# Patient Record
Sex: MALE | Race: WHITE | NOT HISPANIC OR LATINO | URBAN - METROPOLITAN AREA
[De-identification: names, ages, dates, MRNs, and addresses within clinical notes are randomized per-mention and may not be internally consistent; named-entity substitution may affect disease eponyms.]

---

## 2019-10-15 ENCOUNTER — EMERGENCY (EMERGENCY)
Facility: HOSPITAL | Age: 29
LOS: 1 days | Discharge: HOME | End: 2019-10-15
Attending: EMERGENCY MEDICINE | Admitting: EMERGENCY MEDICINE
Payer: COMMERCIAL

## 2019-10-15 VITALS
DIASTOLIC BLOOD PRESSURE: 58 MMHG | HEART RATE: 77 BPM | RESPIRATION RATE: 18 BRPM | SYSTOLIC BLOOD PRESSURE: 126 MMHG | OXYGEN SATURATION: 97 % | TEMPERATURE: 98 F

## 2019-10-15 VITALS
SYSTOLIC BLOOD PRESSURE: 131 MMHG | RESPIRATION RATE: 16 BRPM | TEMPERATURE: 101 F | OXYGEN SATURATION: 97 % | HEART RATE: 90 BPM | DIASTOLIC BLOOD PRESSURE: 83 MMHG | WEIGHT: 184.97 LBS

## 2019-10-15 LAB
ALBUMIN SERPL ELPH-MCNC: 3.9 G/DL — SIGNIFICANT CHANGE UP (ref 3.4–5)
ALP SERPL-CCNC: 111 U/L — SIGNIFICANT CHANGE UP (ref 40–120)
ALT FLD-CCNC: 69 U/L — HIGH (ref 12–42)
ANION GAP SERPL CALC-SCNC: 10 MMOL/L — SIGNIFICANT CHANGE UP (ref 9–16)
APPEARANCE UR: CLEAR — SIGNIFICANT CHANGE UP
AST SERPL-CCNC: 56 U/L — HIGH (ref 15–37)
BILIRUB SERPL-MCNC: 0.4 MG/DL — SIGNIFICANT CHANGE UP (ref 0.2–1.2)
BILIRUB UR-MCNC: NEGATIVE — SIGNIFICANT CHANGE UP
BUN SERPL-MCNC: 9 MG/DL — SIGNIFICANT CHANGE UP (ref 7–23)
CALCIUM SERPL-MCNC: 8.7 MG/DL — SIGNIFICANT CHANGE UP (ref 8.5–10.5)
CHLORIDE SERPL-SCNC: 105 MMOL/L — SIGNIFICANT CHANGE UP (ref 96–108)
CO2 SERPL-SCNC: 26 MMOL/L — SIGNIFICANT CHANGE UP (ref 22–31)
COLOR SPEC: YELLOW — SIGNIFICANT CHANGE UP
CREAT SERPL-MCNC: 1.01 MG/DL — SIGNIFICANT CHANGE UP (ref 0.5–1.3)
DIFF PNL FLD: NEGATIVE — SIGNIFICANT CHANGE UP
GLUCOSE SERPL-MCNC: 113 MG/DL — HIGH (ref 70–99)
GLUCOSE UR QL: NEGATIVE — SIGNIFICANT CHANGE UP
HCT VFR BLD CALC: 44.1 % — SIGNIFICANT CHANGE UP (ref 39–50)
HGB BLD-MCNC: 15.3 G/DL — SIGNIFICANT CHANGE UP (ref 13–17)
KETONES UR-MCNC: NEGATIVE — SIGNIFICANT CHANGE UP
LEUKOCYTE ESTERASE UR-ACNC: NEGATIVE — SIGNIFICANT CHANGE UP
LIDOCAIN IGE QN: 170 U/L — SIGNIFICANT CHANGE UP (ref 73–393)
MCHC RBC-ENTMCNC: 29.3 PG — SIGNIFICANT CHANGE UP (ref 27–34)
MCHC RBC-ENTMCNC: 34.7 G/DL — SIGNIFICANT CHANGE UP (ref 32–36)
MCV RBC AUTO: 84.3 FL — SIGNIFICANT CHANGE UP (ref 80–100)
NITRITE UR-MCNC: NEGATIVE — SIGNIFICANT CHANGE UP
PH UR: 7 — SIGNIFICANT CHANGE UP (ref 5–8)
PLATELET # BLD AUTO: 130 K/UL — LOW (ref 150–400)
POTASSIUM SERPL-MCNC: 4.1 MMOL/L — SIGNIFICANT CHANGE UP (ref 3.5–5.3)
POTASSIUM SERPL-SCNC: 4.1 MMOL/L — SIGNIFICANT CHANGE UP (ref 3.5–5.3)
PROT SERPL-MCNC: 7.3 G/DL — SIGNIFICANT CHANGE UP (ref 6.4–8.2)
PROT UR-MCNC: NEGATIVE MG/DL — SIGNIFICANT CHANGE UP
RBC # BLD: 5.23 M/UL — SIGNIFICANT CHANGE UP (ref 4.2–5.8)
RBC # FLD: 12.9 % — SIGNIFICANT CHANGE UP (ref 10.3–14.5)
SODIUM SERPL-SCNC: 141 MMOL/L — SIGNIFICANT CHANGE UP (ref 132–145)
SP GR SPEC: <=1.005 — SIGNIFICANT CHANGE UP (ref 1–1.03)
UROBILINOGEN FLD QL: 0.2 E.U./DL — SIGNIFICANT CHANGE UP
WBC # BLD: 4.3 K/UL — SIGNIFICANT CHANGE UP (ref 3.8–10.5)
WBC # FLD AUTO: 4.3 K/UL — SIGNIFICANT CHANGE UP (ref 3.8–10.5)

## 2019-10-15 PROCEDURE — 99284 EMERGENCY DEPT VISIT MOD MDM: CPT

## 2019-10-15 PROCEDURE — 74177 CT ABD & PELVIS W/CONTRAST: CPT | Mod: 26

## 2019-10-15 RX ORDER — MULTIVIT WITH MIN/MFOLATE/K2 340-15/3 G
1 POWDER (GRAM) ORAL ONCE
Refills: 0 | Status: COMPLETED | OUTPATIENT
Start: 2019-10-15 | End: 2019-10-15

## 2019-10-15 RX ORDER — IOHEXOL 300 MG/ML
30 INJECTION, SOLUTION INTRAVENOUS ONCE
Refills: 0 | Status: COMPLETED | OUTPATIENT
Start: 2019-10-15 | End: 2019-10-15

## 2019-10-15 RX ORDER — SODIUM CHLORIDE 9 MG/ML
1000 INJECTION INTRAMUSCULAR; INTRAVENOUS; SUBCUTANEOUS ONCE
Refills: 0 | Status: COMPLETED | OUTPATIENT
Start: 2019-10-15 | End: 2019-10-15

## 2019-10-15 RX ORDER — ACETAMINOPHEN 500 MG
975 TABLET ORAL ONCE
Refills: 0 | Status: COMPLETED | OUTPATIENT
Start: 2019-10-15 | End: 2019-10-15

## 2019-10-15 RX ADMIN — SODIUM CHLORIDE 1000 MILLILITER(S): 9 INJECTION INTRAMUSCULAR; INTRAVENOUS; SUBCUTANEOUS at 01:45

## 2019-10-15 RX ADMIN — Medication 975 MILLIGRAM(S): at 01:44

## 2019-10-15 RX ADMIN — Medication 1 BOTTLE: at 04:50

## 2019-10-15 RX ADMIN — IOHEXOL 30 MILLILITER(S): 300 INJECTION, SOLUTION INTRAVENOUS at 01:47

## 2019-10-15 NOTE — ED PROVIDER NOTE - PROGRESS NOTE DETAILS
discussed ct results, given mg citrate in ed, and advised start stool softener, ct c/w constipation, patient has no focal rlq, and well appearing. given strict return precautions for any worsening.

## 2019-10-15 NOTE — ED PROVIDER NOTE - OBJECTIVE STATEMENT
28 yom pw abd pain.  started Sunday, vague, around umbilicus, w/ dec appetite but no vomiting.  sx worse today.  fever today.  also had a HA yesterday/today, no neck pain, no stiffness.  pt states was at a wedding Saturday, had about 10 drinks (mixture of content throughout the day), more than usual, and had greasy food.  last bm today, feels somewhat constipated.

## 2019-10-15 NOTE — ED ADULT TRIAGE NOTE - CHIEF COMPLAINT QUOTE
pt sent from urgent care for evaluation of abdominal pain for a day with nausea. Pt states pain is in the lower abdomen but hurts more to right lower quadrant. Febrile on arrival.

## 2019-10-15 NOTE — ED PROVIDER NOTE - PATIENT PORTAL LINK FT
You can access the FollowMyHealth Patient Portal offered by [facility] by registering at the following website: [email or web address]. By joining [employer]’s FollowMyHealth portal, you will also be able to view your health information using other applications (apps) compatible with our system.

## 2019-10-15 NOTE — ED ADULT NURSE NOTE - OBJECTIVE STATEMENT
Pt is a 28y male complaining of dull generalized abdominal pain x1 day associated with headache, fever, and chills. PT states that he had a small bowel movement earlier today. PT denies nausea, vomiting, chest pain, and sob.

## 2019-10-15 NOTE — ED PROVIDER NOTE - CLINICAL SUMMARY MEDICAL DECISION MAKING FREE TEXT BOX
fever, anorexia, initial generalized periumbilical abd pain now w/ rlq tenderness, neg rovsing, ?appendicitis ?pancreatitis, will check labs, CT

## 2019-10-15 NOTE — ED PROVIDER NOTE - PHYSICAL EXAMINATION
Physical Exam  GEN: Awake, alert, non-toxic appearing, NCAT  EYES: full EOMI,  ENT: External inspection normal, normal voice,  HEAD: atraumatic  NECK: FROM neck, supple, no meningismus, trachea midline, no JVD  RESP: no tachypnea, no hypoxia, no resp distress,  GI: +BS, Soft, tender diffusely but most prominently rlq, no guarding/rebound,   MSK: FROM all 4 extremities, N/V intact,   SKIN: Color normal for race, warm and dry, no rash  NEURO: Oriented x3, CN 2-12 grossly intact, normal motor, normal sensory

## 2019-10-20 DIAGNOSIS — R51 HEADACHE: ICD-10-CM

## 2019-10-20 DIAGNOSIS — R10.31 RIGHT LOWER QUADRANT PAIN: ICD-10-CM

## 2019-10-20 DIAGNOSIS — R63.0 ANOREXIA: ICD-10-CM

## 2021-11-04 NOTE — ED ADULT NURSE NOTE - CAS DISCH TRANSFER METHOD
SUBJECTIVE:  I had the pleasure of seeing Sreekanth Alvarado for cardiac follow up.      HPI:     Sreekanth Alvarado is an 80 year old male with h/o HTN,CAD - s/p PCI in 1998, Bradycardia - s/p permanent pacemaker, Lymphoma, Hyperlipidemia, carotid stenosis presents here today to follow-up for blood pressure.  He was last seen by Dr. Mann on 9/23/2021.During that visit his dose of Bystolic was increased to 10 mg daily. He reduced his bystolic to 5mg couple weeks ago, because he has had a rash on the righ leg. He mentions that the rash was there for many years, feels that when he reduced th dose, his rash is little better. He has some burning sensation and and itching. He denies of noticing rash any other places. He only has the riash on there right and left lower leg.  He denies of feeling dizzy or lightheaded.  His home blood pressure readings are still elevated in 140s to 150s systolic.  He denies of having chest pain, shortness of breath or palpitations.  Denies of waking up from his sleep due to chest pain, shortness of breath or palpitations.  He is planning to go to [place] for 1 month on December 3.      Past Medical History:   Diagnosis Date   • B-cell lymphoma (CMS/HCC) 04/19/2019   • CAD (coronary artery disease)     s/p PTCA to LCX and PDA 4/3/1998    • Carotid stenosis    • Chronic open angle glaucoma    • Diastolic congestive heart failure (CMS/[facility])    • GERD (gastroesophageal reflux disease)    • History of chemotherapy     RCHOP x 6 cycles (6/3/19-9/3/19)   • History of radiation therapy     retroperitoneum ISRT: 36 gY in 20 fx, 10/22/19-11/18/19   • HLD (hyperlipidemia)    • HTN (hypertension)    • Lumbago    • Meibomian gland dysfunction    • Spinal stenosis    • SSS (sick sinus syndrome) (CMS/[facility])     s/p dual chamber pacemaker ([facility]) 3/8/17      Past Surgical History:   Procedure Laterality Date   • ------------other-------------      urolift per patient   • Appendectomy     • Bone  marrow biopsy  2019   • Cataract extraction, bilateral     • Hemorrhoidectomy     • Left heart cath     • Lymph node biopsy  2019    CT-guided retroperitoneal lymph node   • Mediport insertion, single  2019   • Open coronary endarterectomy     • Pacemaker  2017    dual chamber   • Spine surgery     • Tonsillectomy       Family History   Problem Relation Age of Onset   • Heart disease Father    • Kidney disease Father    • Cancer Brother    • Heart disease Brother    • Kidney disease Brother    • Cancer Maternal Grandfather          from Black Lung Disease   • COPD Maternal Grandfather    • Heart disease Paternal Grandfather      Social History     Socioeconomic History   • Marital status: /Civil Union     Spouse name: Not on file   • Number of children: 3   • Years of education: Not on file   • Highest education level: Not on file   Occupational History   • Occupation: retiired from [employer]   Tobacco Use   • Smoking status: Never Smoker   • Smokeless tobacco: Never Used   Substance and Sexual Activity   • Alcohol use: Not Currently   • Drug use: Never   • Sexual activity: Not Currently   Other Topics Concern   • Not on file   Social History Narrative    3 biological children and 3 step-children     Social Determinants of Health     Financial Resource Strain:    • Social Determinants: Financial Resource Strain: Not on file   Food Insecurity:    • Social Determinants: Food Insecurity: Not on file   Transportation Needs:    • Lack of Transportation (Medical): Not on file   • Lack of Transportation (Non-Medical): Not on file   Physical Activity:    • Days of Exercise per Week: Not on file   • Minutes of Exercise per Session: Not on file   Stress:    • Social Determinants: Stress: Not on file   Social Connections:    • Social Determinants: Social Connections: Not on file   Intimate Partner Violence:    • Social Determinants: Intimate Partner Violence Past Fear: Not on file   •  Social Determinants: Intimate Partner Violence Current Fear: Not on file       MEDICATIONS:   Current Outpatient Medications   Medication Sig Dispense Refill   • nebivolol (Bystolic) 10 MG tablet Take 1 tablet by mouth daily. 90 tablet 3   • amLODIPine (NORVASC) 5 MG tablet Take 1 tablet by mouth daily. 30 tablet 5   • simvastatin (ZOCOR) 20 MG tablet Take 1 tablet by mouth nightly. 90 tablet 3   • neomycin-polymyxin B-dexamethasone (MAXITROL) 3.5-05498-4.1 ophthalmic suspension Place 1 drop into right eye 4 times daily. (Patient taking differently: Place 1 drop into right eye 4 times daily. Indications: patient reports takes as needed ) 5 mL 0   • aspirin 81 MG tablet Take 81 mg by mouth daily.     • docusate sodium-sennosides (SENOKOT S) 50-8.6 MG per tablet Take 1 tablet by mouth nightly.     • Hypromellose (ARTIFICIAL TEARS OP) Indications: Patient reports as prn      • GLUCOSAMINE CHONDROITIN COMPLX PO Take by mouth daily.      • Ascorbic Acid (VITAMIN C) 500 MG Cap Take 500 mg by mouth daily.      • Lactobacillus (ACIDOPHILUS) Tab Take 1 tablet by mouth as needed. Indications: reports takes every so often      • Multiple Vitamins-Minerals (CENTRUM SILVER) tablet 1/2 tab qd     • Vitamins-Lipotropics (LIPOFLAVONOID) Tab 1/2 tab qd.       No current facility-administered medications for this visit.     Facility-Administered Medications Ordered in Other Visits   Medication Dose Route Frequency Provider Last Rate Last Admin   • fludeoxyglucose F-18 (FDG)  MCI/ML injection 12.61 millicurie  12.61 millicurie Intravenous Once Demetri Bell MD             PHYSICAL EXAM:  BP (!) 160/78 (BP Location: RUE - Right upper extremity, Patient Position: Sitting, Cuff Size: Regular)   Pulse 60   Resp 18   Ht 5' 10\" (1.778 m)   Wt 87.6 kg (193 lb 3.2 oz)   BMI 27.72 kg/m²   BSA 2.06 m²      This is an alert and oriented to time, place, and person non-obese, well groomed and well-hydrated 80 year old male. Patient  is in no acute distress.    HEENT: 3/3 carotid pulses bilateral symmetrical without bruit. No JVD seen at 90 degree. Conjunctiva white and non-jaundice. SOWMYA. Oral mucosa is pink and moist.     Resp: Respiratory effort bilateral symmetrical with good expansion. Lungs are clear to auscultation. No wheezes and rhonchi auscultated.    CV:    PMI at 5th intercostal space mid-clavicular line.  Heart rate and rhythm are regular at  60 BPM. No murmur, S3, nor S4 auscultated. Normal S1 and S2.    ABD:  Soft, normal active bowel sound in all 4 quadrants, non-tender to palpation. No hepatomegaly or splenomegaly. No bruit over abdominal aorta.    EXT: +dry scaly rash on the lower leg bilat, no erythema. 3/3 Femoral Artery pulses bilateral without bruit. no edema . No cyanosis, or clubbing noted. 3/3 pulses palpated at Dorsalis Pedis and posterior tibial artery bilaterally. No skin pigmentation or ulceration noted bilaterally.    Neuro: CN II-XII grossly intact. Gait is steady. Speech is fluent and coherent. Muscular strength 5/5 bilaterally in all 4 extremities.    Lab / Testing:    Nurse Only on 09/09/2021   Component Date Value Ref Range Status   • WHITE BLOOD CELL COUNT 09/09/2021 5.3  4.0 - 10.0 10*3/uL Final   • RED BLOOD CELL COUNT 09/09/2021 4.76  3.90 - 5.70 10*6/uL Final   • HEMOGLOBIN 09/09/2021 15.1  13.7 - 17.5 g/dL Final   • HEMATOCRIT 09/09/2021 44.3  40.0 - 51.0 % Final   • MEAN CORPUSCULAR VOLUME 09/09/2021 93.1  79.0 - 95.0 fL Final   • MEAN CORPUSCULAR HGB 09/09/2021 31.7  27.0 - 34.0 pg Final   • MEAN CORPUSCULAR HGB CONCENTRATION 09/09/2021 34.1  32.0 - 36.0 % Final   • PLATELET COUNT 09/09/2021 93* 150 - 400 10*3/uL Final   • MEAN PLATELET VOLUME 09/09/2021 10.0  8.6 - 12.4 fL Final   • RED CELL DISTRIBUTION WIDTH 09/09/2021 12.7  11.3 - 14.8 % Final   • NEUTROPHIL PERCENT 09/09/2021 63.6  34.0 - 73.5 % Final   • NEUTROPHIL ABSOLUTE # 09/09/2021 3.4  1.4 - 6.5 10*3/uL Final   • IMMATURE GRANULOCYTE  PERCENT 09/09/2021 0.2  0.0 - 0.5 % Final   • IMMATURE GRANULOCYTE ABSOLUTE 09/09/2021 0.01  0.00 - 0.05 10*3/uL Final   • LYMPH PERCENT 09/09/2021 21.4  20.5 - 51.1 % Final   • LYMPHOCYTE ABSOLUTE # 09/09/2021 1.1* 1.2 - 3.4 10*3/uL Final   • MONOCYTE PERCENT 09/09/2021 11.0  4.3 - 12.9 % Final   • MONOCYTE ABSOLUTE # 09/09/2021 0.6  0.2 - 0.9 10*3/uL Final   • EOSINOPHIL % 09/09/2021 3.2  0.0 - 10.0 % Final   • EOSINOPHIL ABSOLUTE # 09/09/2021 0.2  0.0 - 0.5 10*3/uL Final   • BASOPHIL % 09/09/2021 0.6  0.0 - 1.2 % Final   • BASOPHIL ABSOLUTE # 09/09/2021 0.0  0.0 - 0.1 10*3/uL Final   • DIFFERENTIAL TYPE 09/09/2021 AUTO DIFF WITH SMEAR REVIEW   Final   • PROTEIN, TOTAL SERUM 09/09/2021 6.8  6.2 - 8.1 g/dL Final   • NA (SODIUM, SERUM) 09/09/2021 140  136 - 146 mmol/L Final   • K (POTASSIUM, SERUM) 09/09/2021 4.1  3.5 - 5.3 mmol/L Final   • GLUCOSE, RANDOM 09/09/2021 108  70 - 200 mg/dL Final   • CREATININE, SERUM 09/09/2021 1.0  0.6 - 1.6 mg/dL Final   • CO2 VENOUS 09/09/2021 21* 22 - 32 mmol/L Final   • CHLORIDE, SERUM 09/09/2021 107  96 - 107 mmol/L Final   • BLOOD UREA NITROGEN 09/09/2021 18  6 - 27 mg/dL Final   • BILIRUBIN, TOTAL 09/09/2021 0.9  0.0 - 1.0 mg/dL Final   • CALCIUM, SERUM 09/09/2021 9.5  8.6 - 10.6 mg/dL Final   • ASPARTATE AMINOTRNSFRASE(SGOT) 09/09/2021 25  9 - 37 U/L Final   • ALANINE AMINOTRANSFERASE(SGPT) 09/09/2021 18  10 - 35 U/L Final   • ALKALINE PHOSPHATASE([id number]) 09/09/2021 55  45 - 115 U/L Final   • ALBUMIN, SERUM 09/09/2021 4.2  3.6 - 5.1 g/dL Final   • EGFR -R 09/09/2021 >60  >60 mL/min/[1.73m2] Final   • EGFR NON--R 09/09/2021 >60  >60 mL/min/[1.73m2] Final   • LDH (LACTATE DEHYDROGENASE) 09/09/2021 474  313 - 618 U/L Final   • RBC & PLT MORPHOLOGY 09/09/2021 decreased PLT observed   Final   Imaging Services on 09/07/2021   Component Date Value Ref Range Status   • GFR, POC 09/07/2021 >60   Final   Office Visit on 07/29/2021   Component Date Value  Ref Range Status   • BLDR Scan mLs 07/29/2021 0ml   Final   Nurse Only on 06/04/2021   Component Date Value Ref Range Status   • LDH (LACTATE DEHYDROGENASE) 06/04/2021 651* 313 - 618 U/L Final   • WHITE BLOOD CELL COUNT 06/04/2021 6.7  4.0 - 10.0 10*3/uL Final   • RED BLOOD CELL COUNT 06/04/2021 4.77  3.90 - 5.70 10*6/uL Final   • HEMOGLOBIN 06/04/2021 15.0  13.7 - 17.5 g/dL Final   • HEMATOCRIT 06/04/2021 44.8  40.0 - 51.0 % Final   • MEAN CORPUSCULAR VOLUME 06/04/2021 93.9  79.0 - 95.0 fL Final   • MEAN CORPUSCULAR HGB 06/04/2021 31.4  27.0 - 34.0 pg Final   • MEAN CORPUSCULAR HGB CONCENTRATION 06/04/2021 33.5  32.0 - 36.0 % Final   • PLATELET COUNT 06/04/2021 109* 150 - 400 10*3/uL Final   • MEAN PLATELET VOLUME 06/04/2021 9.7  8.6 - 12.4 fL Final   • RED CELL DISTRIBUTION WIDTH 06/04/2021 12.7  11.3 - 14.8 % Final   • NEUTROPHIL PERCENT 06/04/2021 66.5  34.0 - 73.5 % Final   • NEUTROPHIL ABSOLUTE # 06/04/2021 4.5  1.4 - 6.5 10*3/uL Final   • IMMATURE GRANULOCYTE PERCENT 06/04/2021 0.1  0.0 - 0.5 % Final   • IMMATURE GRANULOCYTE ABSOLUTE 06/04/2021 0.01  0.00 - 0.05 10*3/uL Final   • LYMPH PERCENT 06/04/2021 19.5* 20.5 - 51.1 % Final   • LYMPHOCYTE ABSOLUTE # 06/04/2021 1.3  1.2 - 3.4 10*3/uL Final   • MONOCYTE PERCENT 06/04/2021 11.0  4.3 - 12.9 % Final   • MONOCYTE ABSOLUTE # 06/04/2021 0.7  0.2 - 0.9 10*3/uL Final   • EOSINOPHIL % 06/04/2021 2.2  0.0 - 10.0 % Final   • EOSINOPHIL ABSOLUTE # 06/04/2021 0.2  0.0 - 0.5 10*3/uL Final   • BASOPHIL % 06/04/2021 0.7  0.0 - 1.2 % Final   • BASOPHIL ABSOLUTE # 06/04/2021 0.1  0.0 - 0.1 10*3/uL Final   • DIFFERENTIAL TYPE 06/04/2021 AUTO DIFF   Final   • PROTEIN, TOTAL SERUM 06/04/2021 7.1  6.2 - 8.1 g/dL Final   • NA (SODIUM, SERUM) 06/04/2021 139  136 - 146 mmol/L Final   • K (POTASSIUM, SERUM) 06/04/2021 4.1  3.5 - 5.3 mmol/L Final   • GLUCOSE, RANDOM 06/04/2021 110  70 - 200 mg/dL Final   • CREATININE, SERUM 06/04/2021 1.2  0.6 - 1.6 mg/dL Final   • CO2 VENOUS  06/04/2021 28  22 - 32 mmol/L Final   • CHLORIDE, SERUM 06/04/2021 104  96 - 107 mmol/L Final   • BLOOD UREA NITROGEN 06/04/2021 16  6 - 27 mg/dL Final   • BILIRUBIN, TOTAL 06/04/2021 0.8  0.0 - 1.0 mg/dL Final   • CALCIUM, SERUM 06/04/2021 9.4  8.6 - 10.6 mg/dL Final   • ASPARTATE AMINOTRNSFRASE(SGOT) 06/04/2021 23  9 - 37 U/L Final   • ALANINE AMINOTRANSFERASE(SGPT) 06/04/2021 16  10 - 35 U/L Final   • ALKALINE PHOSPHATASE([id number]) 06/04/2021 54  45 - 115 U/L Final   • ALBUMIN, SERUM 06/04/2021 4.5  3.6 - 5.1 g/dL Final   • EGFR -R 06/04/2021 >60  >60 mL/min/[1.73m2] Final   • EGFR NON--R 06/04/2021 >60  >60 mL/min/[1.73m2] Final   Nurse Only on 02/03/2021   Component Date Value Ref Range Status   • LDH (LACTATE DEHYDROGENASE) 02/03/2021 476  313 - 618 U/L Final   • PROTEIN, TOTAL SERUM 02/03/2021 6.5  6.2 - 8.1 g/dL Final   • NA (SODIUM, SERUM) 02/03/2021 141  136 - 146 mmol/L Final   • K (POTASSIUM, SERUM) 02/03/2021 4.2  3.5 - 5.3 mmol/L Final   • GLUCOSE, RANDOM 02/03/2021 109  70 - 200 mg/dL Final   • CREATININE, SERUM 02/03/2021 1.1  0.6 - 1.6 mg/dL Final   • CO2 VENOUS 02/03/2021 25  22 - 32 mmol/L Final   • CHLORIDE, SERUM 02/03/2021 106  96 - 107 mmol/L Final   • BLOOD UREA NITROGEN 02/03/2021 17  6 - 27 mg/dL Final   • BILIRUBIN, TOTAL 02/03/2021 0.7  0.0 - 1.0 mg/dL Final   • CALCIUM, SERUM 02/03/2021 9.3  8.6 - 10.6 mg/dL Final   • ASPARTATE AMINOTRNSFRASE(SGOT) 02/03/2021 26  9 - 37 U/L Final   • ALANINE AMINOTRANSFERASE(SGPT) 02/03/2021 19  10 - 35 U/L Final   • ALKALINE PHOSPHATASE([id number]) 02/03/2021 48  45 - 115 U/L Final   • ALBUMIN, SERUM 02/03/2021 4.3  3.6 - 5.1 g/dL Final   • EGFR -R 02/03/2021 >60  >60 mL/min/[1.73m2] Final   • EGFR NON--R 02/03/2021 >60  >60 mL/min/[1.73m2] Final   • WHITE BLOOD CELL COUNT 02/03/2021 6.9  4.0 - 10.0 10*3/uL Final   • RED BLOOD CELL COUNT 02/03/2021 4.56  3.90 - 5.70 10*6/uL Final   • HEMOGLOBIN  02/03/2021 14.4  13.7 - 17.5 g/dL Final   • HEMATOCRIT 02/03/2021 42.7  40.0 - 51.0 % Final   • MEAN CORPUSCULAR VOLUME 02/03/2021 93.6  79.0 - 95.0 fL Final   • MEAN CORPUSCULAR HGB 02/03/2021 31.6  27.0 - 34.0 pg Final   • MEAN CORPUSCULAR HGB CONCENTRATION 02/03/2021 33.7  32.0 - 36.0 % Final   • PLATELET COUNT 02/03/2021 112* 150 - 400 10*3/uL Final   • MEAN PLATELET VOLUME 02/03/2021 9.8  8.6 - 12.4 fL Final   • RED CELL DISTRIBUTION WIDTH 02/03/2021 12.9  11.3 - 14.8 % Final   • NEUTROPHIL PERCENT 02/03/2021 69.5  34.0 - 73.5 % Final   • NEUTROPHIL ABSOLUTE # 02/03/2021 4.8  1.4 - 6.5 10*3/uL Final   • IMMATURE GRANULOCYTE PERCENT 02/03/2021 0.1  0.0 - 0.5 % Final   • IMMATURE GRANULOCYTE ABSOLUTE 02/03/2021 0.01  0.00 - 0.05 10*3/uL Final   • LYMPH PERCENT 02/03/2021 16.2* 20.5 - 51.1 % Final   • LYMPHOCYTE ABSOLUTE # 02/03/2021 1.1* 1.2 - 3.4 10*3/uL Final   • MONOCYTE PERCENT 02/03/2021 10.6  4.3 - 12.9 % Final   • MONOCYTE ABSOLUTE # 02/03/2021 0.7  0.2 - 0.9 10*3/uL Final   • EOSINOPHIL % 02/03/2021 2.9  0.0 - 10.0 % Final   • EOSINOPHIL ABSOLUTE # 02/03/2021 0.2  0.0 - 0.5 10*3/uL Final   • BASOPHIL % 02/03/2021 0.7  0.0 - 1.2 % Final   • BASOPHIL ABSOLUTE # 02/03/2021 0.1  0.0 - 0.1 10*3/uL Final   • DIFFERENTIAL TYPE 02/03/2021 AUTO DIFF   Final   Imaging Services on 01/21/2021   Component Date Value Ref Range Status   • GFR, POC 01/21/2021 >60   Final   Lab Services on 12/01/2020   Component Date Value Ref Range Status   • CHOLESTEROL, TOTAL 12/01/2020 169  140 - 200 mg/dL Final   • HDL CHOLESTEROL 12/01/2020 44  >40 mg/dL Final   • LDL CHOL, CALCULATED 12/01/2020 96  30 - 100 mg/dL Final   • TRIGLYCERIDES 12/01/2020 146  0 - 200 mg/dL Final        ASSESSMENT/PLAN:   1. Hypertension-blood pressure elevated.  Unable to titrate up bysystolic to 10 mg, possible rash?  Continue Bystolic 5 mg, amlodipine 5 mg daily.  Unable to uptitrate amlodipine due to lower extremity edema.  Start spironolactone 25 mg  daily.  Obtain random BMP in 1 week.  Recommend low-sodium diet less than 2000 mg a day.    2.  Lower extremity rash, appears eczematous  Recommend follow-up with dermatology for further evaluation.  Discussed at length regarding the rash, existed prior to increasing Bystolic.  Recommend evaluation with dermatologist to see if it is related to the drug.    2. Varicose veins    3. CAD - s/p PCI in 1998  No anginal symptoms  Most recent cardiac cath 03/2017 shows moderate CAD. He remains completely asymptomatic.   EKG (11/2020) is within normal limits  Results of Echocardiogram (5/2020) reviewed and LV function is normal  Will continue with aggressive risk factor modification and present medical rx   Recommend he continue ASA 81 mg daily for primary prevention  Will continue to achieve goal LDL with statin therapy. Continue Simvastatin 20 mg daily in PM    4. Bradycardia - s/p permanent pacemaker     5. Lymphoma    6. Hyperlipidemia  Continue simvastatin 20 mg daily.  Low fat low-cholesterol diet recommended.    7. LE edema - resolved off of amlodipine     8. Carotid stenosis - mild bilateral    continue aspirin and statin.     I will see him back in the office in 3 to 4 weeks for further follow up.  Follow-up with Dr. Mann in March 2022 as scheduled or sooner if needed.  Thank you for letting me be involved in the care of this pleasant patient.     Electronically signed by: Ness Gibson PA-C  11/4/2021     Walking

## 2021-11-21 NOTE — ED ADULT NURSE NOTE - SUICIDE SCREENING QUESTION 2
Hospitalist - History & Physical      Patient: Alix Ferguson    Unit/Bed:39/039A   YOB: 1982  MRN: [id number]   Acct: [de-identified]   PCP: Lizzette Rivera MD      Assessment and Plan:        1. Acute hypoxic respiratory failure due to COVID 19:   a. O2 titration protocol - on 100% FiO2 18/8 bipap on admission  b. Supplements - vitamins C, D, and zinc  c. Daily ASA  d. BID Lovenox  e. Dexamethasone 20 mg IVP daily x 5 days, 10mg IVP x 5 days  f. Pharmacy consult for tocilizumab  g. Incentive spirometry and acapella  h. Pulmonary hygiene - albuterol med nebs or inhalers  i. Procalcitonin is 0.50 at this time - secondary bacterial infection is likely - IV abx started   j. Consult ICU NP Adriano Nguyen for possible escalation to ECMO       CC:  Shortness of breath    HPI: Patient presents to the ED with progressive shortness of breath. The patient has been ill for 10 days. The patient was evaluated in an ER two times prior to his presentation here. He was discharged home despite hypoxia. The patient's wife found a holistic doctor that did a series of treatments at his office that gave the patient transient relief. Today prior to presentation the patient has oxygen saturations at 79% on room air. While in the ED the patient escalated from nasal cannula, to high flow nasal cannula to BIPAP to maintain his oxygen saturations. The patient will be admitted to step down for further care and treatment of COVID 19. ROS: Review of Systems   Constitutional: Positive for activity change, appetite change, chills and fever. HENT: Negative. Eyes: Negative. Respiratory: Positive for cough and shortness of breath. Cardiovascular: Negative. Gastrointestinal: Positive for abdominal pain, nausea and vomiting. Endocrine: Negative. Genitourinary: Negative. Musculoskeletal: Positive for arthralgias and myalgias. Skin: Negative. Allergic/Immunologic: Negative. Neurological: Negative. Hematological: Negative. Psychiatric/Behavioral: Negative. PMH:    Past Medical History:   Diagnosis Date    Heart murmur     Hypotension     POTS    POTS (postural orthostatic tachycardia syndrome)     Thyroid disease 2018     SHX:    Social History     Socioeconomic History    Marital status:      Spouse name: Not on file    Number of children: Not on file    Years of education: Not on file    Highest education level: Not on file   Occupational History    Not on file   Tobacco Use    Smoking status: Former Smoker     Quit date: 2016     Years since quittin.2    Smokeless tobacco: Never Used   Vaping Use    Vaping Use: Never used   Substance and Sexual Activity    Alcohol use: No    Drug use: No    Sexual activity: Not on file   Other Topics Concern    Not on file   Social History Narrative    Not on file     Social Determinants of Health     Financial Resource Strain:     Difficulty of Paying Living Expenses: Not on file   Food Insecurity:     Worried About [address] in the Last Year: Not on file    [address] in the Last Year: Not on file   Transportation Needs:     Lack of Transportation (Medical): Not on file    Lack of Transportation (Non-Medical):  Not on file   Physical Activity:     Days of Exercise per Week: Not on file    Minutes of Exercise per Session: Not on file   Stress:     Feeling of Stress : Not on file   Social Connections:     Frequency of Communication with Friends and Family: Not on file    Frequency of Social Gatherings with Friends and Family: Not on file    Attends Restorationist Services: Not on file    Active Member of Clubs or Organizations: Not on file    Attends Club or Organization Meetings: Not on file    Marital Status: Not on file   Intimate Partner Violence:     Fear of Current or Ex-Partner: Not on file    Emotionally Abused: Not on file    Physically Abused: Not on file    Sexually Abused: Not on file Housing Stability:     Unable to Pay for Housing in the Last Year: Not on file    Number of Places Lived in the Last Year: Not on file    Unstable Housing in the Last Year: Not on file     FHX:   Family History   Problem Relation Age of Onset    Cancer Father     Cancer Maternal Grandfather      Allergies:    Allergies   Allergen Reactions    Codeine Other (See Comments)     hallucinations     Medications:       dexamethasone  20 mg IntraVENous Once          Labs:   Recent Results (from the past 24 hour(s))   CBC auto differential    Collection Time: 11/20/21  9:11 PM   Result Value Ref Range    WBC 8.3 4.8 - 10.8 thou/mm3    RBC 4.93 4.70 - 6.10 mill/mm3    Hemoglobin 14.9 14.0 - 18.0 gm/dl    Hematocrit 42.6 42.0 - 52.0 %    MCV 86.4 80.0 - 94.0 fL    MCH 30.2 26.0 - 33.0 pg    MCHC 35.0 32.2 - 35.5 gm/dl    RDW-CV 12.9 11.5 - 14.5 %    RDW-SD 40.2 35.0 - 45.0 fL    Platelets 086 775 - 199 thou/mm3    MPV 11.1 9.4 - 12.4 fL    Seg Neutrophils 88.3 %    Lymphocytes 7.6 %    Monocytes 3.5 %    Eosinophils 0.0 %    Basophils 0.0 %    Immature Granulocytes 0.6 %    Segs Absolute 7.3 1.8 - 7.7 thou/mm3    Lymphocytes Absolute 0.6 (L) 1.0 - 4.8 thou/mm3    Monocytes Absolute 0.3 (L) 0.4 - 1.3 thou/mm3    Eosinophils Absolute 0.0 0.0 - 0.4 thou/mm3    Basophils Absolute 0.0 0.0 - 0.1 thou/mm3    Immature Grans (Abs) 0.05 0.00 - 0.07 thou/mm3    nRBC 0 /100 wbc   Comprehensive Metabolic Panel    Collection Time: 11/20/21  9:11 PM   Result Value Ref Range    Glucose 121 (H) 70 - 108 mg/dL    CREATININE 0.9 0.4 - 1.2 mg/dL    BUN 21 7 - 22 mg/dL    Sodium 139 135 - 145 meq/L    Potassium 3.8 3.5 - 5.2 meq/L    Chloride 102 98 - 111 meq/L    CO2 26 23 - 33 meq/L    Calcium 8.7 8.5 - 10.5 mg/dL    AST 53 (H) 5 - 40 U/L    Alkaline Phosphatase 73 38 - 126 U/L    Total Protein 6.0 (L) 6.1 - 8.0 g/dL    Albumin 3.7 3.5 - 5.1 g/dL    Total Bilirubin 0.4 0.3 - 1.2 mg/dL    ALT 36 11 - 66 U/L   C-reactive protein Collection Time: 11/20/21  9:11 PM   Result Value Ref Range    CRP 6.24 (H) 0.00 - 1.00 mg/dl   Procalcitonin    Collection Time: 11/20/21  9:11 PM   Result Value Ref Range    Procalcitonin 0.50 (H) 0.01 - 0.09 ng/mL   TSH with Reflex    Collection Time: 11/20/21  9:11 PM   Result Value Ref Range    TSH 1.060 0.400 - 4.200 uIU/mL   Lactic acid, plasma    Collection Time: 11/20/21  9:11 PM   Result Value Ref Range    Lactic Acid 1.3 0.5 - 2.0 mmol/L   Troponin    Collection Time: 11/20/21  9:11 PM   Result Value Ref Range    Troponin T < 0.010 ng/ml   Anion Gap    Collection Time: 11/20/21  9:11 PM   Result Value Ref Range    Anion Gap 11.0 8.0 - 16.0 meq/L   Osmolality    Collection Time: 11/20/21  9:11 PM   Result Value Ref Range    Osmolality Calc 281.8 275.0 - 300.0 mOsmol/kg   Glomerular Filtration Rate, Estimated    Collection Time: 11/20/21  9:11 PM   Result Value Ref Range    Est, Glom Filt Rate >90 ml/min/1.73m2         Vital Signs: T: 100.8F P: 62 RR: 22 B/P: 120/79: FiO2: 100% FiO2 18/8 BIPAP: O2 Sat:95%: I/O: No intake or output data in the 24 hours ending 11/20/21 2324      General:   Ill appearing  HEENT:  normocephalic and atraumatic. No scleral icterus. PEARLA, mucous membranes moist  Neck: supple. Trachea midline. No JVD. Full ROM, no meningismus. Lungs: diminished on auscultation. No retractions, + accessory muscle use, tachypneic  Cardiac: RRR, no murmur, 2+ pulses  Abdomen: soft. Nontender. Bowel sounds active  Extremities:  No clubbing, cyanosis x 4, no edema    Vasculature: capillary refill < 3 seconds. Skin:  warm and dry. no visible rashes  Psych:  Alert and oriented x3. Affect appropriate  Lymph:  No supraclavicular adenopathy. Neurologic:  CN II-XII grossly intact. No focal deficit. Data: (All radiographs, tracings, PFTs, and imaging are personally viewed and interpreted unless otherwise noted).     EKG: pending this encounter        Electronically signed by  Marybeth Chan PA-C No